# Patient Record
Sex: MALE | Race: OTHER
[De-identification: names, ages, dates, MRNs, and addresses within clinical notes are randomized per-mention and may not be internally consistent; named-entity substitution may affect disease eponyms.]

---

## 2022-02-01 PROBLEM — Z00.00 ENCOUNTER FOR PREVENTIVE HEALTH EXAMINATION: Status: ACTIVE | Noted: 2022-02-01

## 2022-02-09 DIAGNOSIS — Z87.09 PERSONAL HISTORY OF OTHER DISEASES OF THE RESPIRATORY SYSTEM: ICD-10-CM

## 2022-02-09 DIAGNOSIS — Z86.2 PERSONAL HISTORY OF DISEASES OF THE BLOOD AND BLOOD-FORMING ORGANS AND CERTAIN DISORDERS INVOLVING THE IMMUNE MECHANISM: ICD-10-CM

## 2022-02-09 DIAGNOSIS — Z86.711 PERSONAL HISTORY OF PULMONARY EMBOLISM: ICD-10-CM

## 2022-02-09 DIAGNOSIS — U07.1 COVID-19: ICD-10-CM

## 2022-02-09 RX ORDER — MONTELUKAST 10 MG/1
10 TABLET, FILM COATED ORAL
Refills: 0 | Status: ACTIVE | COMMUNITY

## 2022-02-09 RX ORDER — APIXABAN 5 MG/1
5 TABLET, FILM COATED ORAL
Qty: 60 | Refills: 8 | Status: ACTIVE | COMMUNITY

## 2022-02-09 RX ORDER — FLUTICASONE FUROATE AND VILANTEROL TRIFENATATE 200; 25 UG/1; UG/1
200-25 POWDER RESPIRATORY (INHALATION)
Refills: 0 | Status: ACTIVE | COMMUNITY

## 2022-02-14 ENCOUNTER — LABORATORY RESULT (OUTPATIENT)
Age: 52
End: 2022-02-14

## 2022-02-14 ENCOUNTER — APPOINTMENT (OUTPATIENT)
Dept: HEMATOLOGY ONCOLOGY | Facility: CLINIC | Age: 52
End: 2022-02-14
Payer: COMMERCIAL

## 2022-02-14 VITALS
HEIGHT: 68 IN | BODY MASS INDEX: 23.95 KG/M2 | RESPIRATION RATE: 18 BRPM | WEIGHT: 158 LBS | TEMPERATURE: 98.2 F | SYSTOLIC BLOOD PRESSURE: 116 MMHG | DIASTOLIC BLOOD PRESSURE: 82 MMHG | HEART RATE: 91 BPM | OXYGEN SATURATION: 97 %

## 2022-02-14 DIAGNOSIS — Z78.9 OTHER SPECIFIED HEALTH STATUS: ICD-10-CM

## 2022-02-14 DIAGNOSIS — Z80.9 FAMILY HISTORY OF MALIGNANT NEOPLASM, UNSPECIFIED: ICD-10-CM

## 2022-02-14 DIAGNOSIS — Z82.0 FAMILY HISTORY OF EPILEPSY AND OTHER DISEASES OF THE NERVOUS SYSTEM: ICD-10-CM

## 2022-02-14 PROCEDURE — 99205 OFFICE O/P NEW HI 60 MIN: CPT

## 2022-02-15 NOTE — ASSESSMENT
[FreeTextEntry1] : Given the above constellation of findings, I would favor performing a complete thrombophilia workup at today's office visit.  His recent Covid infection likely contributed to this venous thromboembolic event.\par I discussed this plan with the patient and he is agreeable to it.\par This workup will include a lupus anticoagulant, beta-2 glycoprotein panel, cardiolipin antibody panel, factor V Leiden, prothrombin gene mutation, factor VIII activity levels, paraprotein and evaluation, HIRO, DRE-1 mutation analysis, protein C/S activity levels, and an ESR.\par He will return in 4 weeks for followup and to review the above mentioned workup.\par \par Thank you very much for allowing me to participate in this gentlemen's medical care.   Should you have any questions or concerns please do not hesitate to call me directly.

## 2022-02-15 NOTE — HISTORY OF PRESENT ILLNESS
[de-identified] : Mr. Lackey is a 51 year old male who is referred for initial consultation for thrombophilia workup.\par The patient presented to the emergency department at [facility] after having a 1 week history of decreased exercise tolerance and 8 out of 10 pleuritic chest pain. He had been diagnosed with COVID-19 the previous week but had been asymptomatic.  He was found on admission to have a D-dimer of 1145.  Chest CTA showed acute pulmonary embolus and acute left lower lobe pulmonary infarct.  He was started on a heparin drip and then transitioned to apixaban  which he remains on currently.  He denies prior incidence of blood clot or family history of blood clots, stroke or miscarriage. [0 - No Distress] : Distress Level: 0

## 2022-03-11 LAB
ALBUMIN MFR SERPL ELPH: 57.1 %
ALBUMIN SERPL-MCNC: 4.7 G/DL
ALBUMIN/GLOB SERPL: 1.3 RATIO
ALPHA1 GLOB MFR SERPL ELPH: 4 %
ALPHA1 GLOB SERPL ELPH-MCNC: 0.3 G/DL
ALPHA2 GLOB MFR SERPL ELPH: 8.4 %
ALPHA2 GLOB SERPL ELPH-MCNC: 0.7 G/DL
ANA SER IF-ACNC: NEGATIVE
APTT BLD: 38.2 SEC
APTT HEX PL PPP: NEGATIVE
AT III PPP CHRO-ACNC: 125 %
B-GLOBULIN MFR SERPL ELPH: 11.6 %
B-GLOBULIN SERPL ELPH-MCNC: 1 G/DL
B2 GLYCOPROT1 IGG SER-ACNC: <5 SGU
B2 GLYCOPROT1 IGM SER-ACNC: 6.5 SMU
CARDIOLIPIN AB SER IA-ACNC: NEGATIVE
CARDIOLIPIN IGM SER-MCNC: 17.3 MPL
CARDIOLIPIN IGM SER-MCNC: <5 GPL
DEPRECATED KAPPA LC FREE/LAMBDA SER: 0.96 RATIO
DEPRECATED KAPPA LC FREE/LAMBDA SER: 0.96 RATIO
DNA PLOIDY SPEC FC-IMP: NORMAL
ERYTHROCYTE [SEDIMENTATION RATE] IN BLOOD BY WESTERGREN METHOD: 27 MM/HR
FACT VIII ACT/NOR PPP: 96 %
FERRITIN SERPL-MCNC: 162 NG/ML
FOLATE SERPL-MCNC: 7.5 NG/ML
GAMMA GLOB FLD ELPH-MCNC: 1.6 G/DL
GAMMA GLOB MFR SERPL ELPH: 18.9 %
HAPTOGLOB SERPL-MCNC: 113 MG/DL
HEX-1: 41.1 SECS
HEX-2: 41.1 SECS
IGA SER QL IEP: 295 MG/DL
IGG SER QL IEP: 1677 MG/DL
IGM SER QL IEP: 188 MG/DL
INTERPRETATION SERPL IEP-IMP: NORMAL
IRON SATN MFR SERPL: 26 %
IRON SERPL-MCNC: 91 UG/DL
KAPPA LC CSF-MCNC: 2.04 MG/DL
KAPPA LC CSF-MCNC: 2.04 MG/DL
KAPPA LC SERPL-MCNC: 1.96 MG/DL
KAPPA LC SERPL-MCNC: 1.96 MG/DL
LDH SERPL-CCNC: 168 U/L
M PROTEIN SPEC IFE-MCNC: NORMAL
METHYLMALONATE SERPL-SCNC: 225 NMOL/L
PROT C PPP CHRO-ACNC: 167 %
PROT S FREE PPP-ACNC: 153 %
PROT SERPL-MCNC: 8.3 G/DL
PROT SERPL-MCNC: 8.3 G/DL
PTR INTERP: NORMAL
TIBC SERPL-MCNC: 353 UG/DL
TSH SERPL-ACNC: 2.2 UIU/ML
UIBC SERPL-MCNC: 262 UG/DL
VIT B12 SERPL-MCNC: 436 PG/ML

## 2022-03-14 ENCOUNTER — APPOINTMENT (OUTPATIENT)
Dept: HEMATOLOGY ONCOLOGY | Facility: CLINIC | Age: 52
End: 2022-03-14

## 2022-03-14 VITALS
DIASTOLIC BLOOD PRESSURE: 85 MMHG | RESPIRATION RATE: 18 BRPM | HEART RATE: 88 BPM | HEIGHT: 68 IN | TEMPERATURE: 98.6 F | WEIGHT: 162 LBS | OXYGEN SATURATION: 99 % | SYSTOLIC BLOOD PRESSURE: 132 MMHG | BODY MASS INDEX: 24.55 KG/M2

## 2022-03-14 DIAGNOSIS — I26.99 OTHER PULMONARY EMBOLISM W/OUT ACUTE COR PULMONALE: ICD-10-CM

## 2022-03-14 PROCEDURE — 99215 OFFICE O/P EST HI 40 MIN: CPT

## 2022-05-24 ENCOUNTER — APPOINTMENT (OUTPATIENT)
Dept: HEMATOLOGY ONCOLOGY | Facility: CLINIC | Age: 52
End: 2022-05-24

## 2022-07-18 PROBLEM — I26.99 PULMONARY EMBOLISM: Status: ACTIVE | Noted: 2022-02-14
